# Patient Record
Sex: FEMALE | Race: WHITE | URBAN - METROPOLITAN AREA
[De-identification: names, ages, dates, MRNs, and addresses within clinical notes are randomized per-mention and may not be internally consistent; named-entity substitution may affect disease eponyms.]

---

## 2022-05-23 ENCOUNTER — APPOINTMENT (RX ONLY)
Dept: URBAN - METROPOLITAN AREA CLINIC 116 | Facility: CLINIC | Age: 57
Setting detail: DERMATOLOGY
End: 2022-05-23

## 2022-05-23 DIAGNOSIS — Z41.9 ENCOUNTER FOR PROCEDURE FOR PURPOSES OTHER THAN REMEDYING HEALTH STATE, UNSPECIFIED: ICD-10-CM

## 2022-05-23 PROCEDURE — ? BOTOX

## 2022-05-23 NOTE — PROCEDURE: BOTOX
Additional Area 5 Units: 0
Show Right And Left Brow Units: No
Comments: Given to patient at employee cost per Dr. Libby Ndiaye.  (Employees mother)
Show Additional Area 3: Yes
Post-Care Instructions: Patient instructed to not lie down for 4 hours and limit physical activity for 24 hours. Patient instructed not to travel by airplane for 48 hours.
Additional Area 3 Location: perioral
Additional Area 6 Location: axilla
Glabellar Complex Units: [address]
Detail Level: Zone
Expiration Date (Month Year): 03/2024
Additional Area 4 Location: oral commisures
Forehead Units: 8
Lot #: [id number]
Show Inventory Tab: Show
Additional Area 1 Location: neck
Price (Use Numbers Only, No Special Characters Or $): Tiera Pathak
Dilution (U/0.1 Cc): 1
Additional Area 5 Location: lip flip
Periorbital Skin Units: [address]
Consent: Written consent obtained. Risks include but not limited to lid/brow ptosis, bruising, swelling, diplopia, temporary effect, incomplete chemical denervation.
Additional Area 2 Location: Crows feet

## 2022-09-13 ENCOUNTER — APPOINTMENT (RX ONLY)
Dept: URBAN - METROPOLITAN AREA CLINIC 116 | Facility: CLINIC | Age: 57
Setting detail: DERMATOLOGY
End: 2022-09-13

## 2022-09-13 DIAGNOSIS — Z41.9 ENCOUNTER FOR PROCEDURE FOR PURPOSES OTHER THAN REMEDYING HEALTH STATE, UNSPECIFIED: ICD-10-CM

## 2022-09-13 PROCEDURE — ? BOTOX

## 2022-09-13 PROCEDURE — ? FILLERS

## 2022-09-13 NOTE — PROCEDURE: FILLERS
Additional Area 2 Location: chin
Tear Troughs Filler  Volume In Cc: 0
Include Cannula Information In Note?: No
Aspiration Statement: Aspiration was performed prior to injecting site with filler.
Filler: Juvederm Vollure XC
Detail Level: Zone
Additional Area 3 Location: neck
Lot #: [id number]
Vermilion Lips Filler Volume In Cc: 0.5
Additional Area 1 Location: oral commissures
Consent: Written consent obtained. Risks include but not limited to bruising, beading, irregular texture, ulceration, infection, allergic reaction, scar formation, incomplete augmentation, temporary nature, procedural pain.
Post-Care Instructions: Patient instructed to apply ice to reduce swelling.
Lot #: [id number]
Map Statment: See [address] for Complete Details
Filler Comments: Sample used for a training
Expiration Date (Month Year): 04/16/ 2023
Lot #: [id number]
Expiration Date (Month Year): 7/26/22
Additional Area 1 Location: tear trough

## 2022-09-13 NOTE — PROCEDURE: BOTOX
Periorbital Skin Units: [address] [facility]
Nasal Root Units: 0
Dilution (U/0.1 Cc): 1
Show Right And Left Periorbital Units: No
Show Nasal Units: Yes
Detail Level: Simple
Lot #: [id number]
Forehead Units: 8
Additional Area 1 Location: chin
Consent: Written consent obtained. Risks include but not limited to lid/brow ptosis, bruising, swelling, diplopia, temporary effect, incomplete chemical denervation.
Glabellar Complex Units: 21
Show Inventory Tab: Show
Post-Care Instructions: Patient instructed to not lie down for 4 hours and limit physical activity for 24 hours. Patient instructed not to travel by airplane for 48 hours.
Patient Specific Comments (Will Not Stick From Patient To Patient): Sample vial was used for patient as a training treatment.
Additional Area 2 Location: lip
Expiration Date (Month Year): 10/23

## 2022-09-13 NOTE — HPI: COSMETIC (BOTOX)
Have You Had Botox Before?: has had botox
Additional History: Training is beings done for this patient.

## 2022-09-15 ENCOUNTER — APPOINTMENT (RX ONLY)
Dept: URBAN - METROPOLITAN AREA CLINIC 121 | Facility: CLINIC | Age: 57
Setting detail: DERMATOLOGY
End: 2022-09-15

## 2022-09-15 DIAGNOSIS — Z41.9 ENCOUNTER FOR PROCEDURE FOR PURPOSES OTHER THAN REMEDYING HEALTH STATE, UNSPECIFIED: ICD-10-CM

## 2022-09-15 PROCEDURE — ? FILLERS

## 2022-09-15 NOTE — PROCEDURE: FILLERS
Additional Area 1 Volume In Cc: 0
Lot #: [id number]
Additional Area 2 Location: chin
Use Map Statement For Sites (Optional): No
Expiration Date (Month Year): 7/26/22
Filler: Juvederm Vollure XC
Additional Area 3 Location: neck
Map Statment: See [address] for Complete Details
Additional Area 1 Location: oral commissures
Vermilion Lips Filler Volume In Cc: 1
Lot #: [id number]
Aspiration Statement: Aspiration was performed prior to injecting site with filler.
Consent: Written consent obtained. Risks include but not limited to bruising, beading, irregular texture, ulceration, infection, allergic reaction, scar formation, incomplete augmentation, temporary nature, procedural pain.
Post-Care Instructions: Patient instructed to apply ice to reduce swelling.
Lot #: [id number]
Expiration Date (Month Year): 12/19/2023
Detail Level: Zone
Additional Area 1 Location: tear trough